# Patient Record
Sex: FEMALE | Race: WHITE | URBAN - METROPOLITAN AREA
[De-identification: names, ages, dates, MRNs, and addresses within clinical notes are randomized per-mention and may not be internally consistent; named-entity substitution may affect disease eponyms.]

---
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
## 2022-10-12 NOTE — H&P ADULT - NSHPLABSRESULTS_GEN_ALL_CORE
< from: CT Angio Chest w/ IV Cont (11.25.19 @ 09:40) >    IMPRESSION:     Motion blurring of the left lateral ninth rib degrades evaluation.   Correlate for findings of localized tenderness.    No pleural effusion or pneumothorax.    No evidence of acute traumatic abnormality in the visualized portions of   the upper abdomen.              < end of copied text > Afx Histology Text: Dermal pleomorphic spindle cells with mitoses.

## 2023-08-10 NOTE — ED PROVIDER NOTE - BIRTH SEX
Call from pt requesting to speak with Dr. Shayan Salter or April to discuss her lab results     Please advise   CB# [phone] Female